# Patient Record
Sex: FEMALE | Race: WHITE | URBAN - METROPOLITAN AREA
[De-identification: names, ages, dates, MRNs, and addresses within clinical notes are randomized per-mention and may not be internally consistent; named-entity substitution may affect disease eponyms.]

---

## 2017-01-10 ENCOUNTER — OFFICE VISIT (OUTPATIENT)
Dept: FAMILY MEDICINE | Facility: CLINIC | Age: 37
End: 2017-01-10
Payer: MEDICAID

## 2017-01-10 VITALS
HEART RATE: 80 BPM | DIASTOLIC BLOOD PRESSURE: 64 MMHG | OXYGEN SATURATION: 97 % | TEMPERATURE: 97.6 F | BODY MASS INDEX: 18.61 KG/M2 | SYSTOLIC BLOOD PRESSURE: 124 MMHG | HEIGHT: 64 IN | WEIGHT: 109 LBS | RESPIRATION RATE: 14 BRPM

## 2017-01-10 DIAGNOSIS — M54.2 NECK PAIN: ICD-10-CM

## 2017-01-10 DIAGNOSIS — Z23 NEED FOR PROPHYLACTIC VACCINATION AND INOCULATION AGAINST INFLUENZA: ICD-10-CM

## 2017-01-10 DIAGNOSIS — F41.9 ANXIETY: Chronic | ICD-10-CM

## 2017-01-10 DIAGNOSIS — F10.10 ALCOHOL ABUSE: ICD-10-CM

## 2017-01-10 DIAGNOSIS — Z00.00 ROUTINE GENERAL MEDICAL EXAMINATION AT A HEALTH CARE FACILITY: Primary | ICD-10-CM

## 2017-01-10 DIAGNOSIS — F33.1 MAJOR DEPRESSIVE DISORDER, RECURRENT EPISODE, MODERATE (H): ICD-10-CM

## 2017-01-10 DIAGNOSIS — Z12.4 SCREENING FOR CERVICAL CANCER: ICD-10-CM

## 2017-01-10 PROBLEM — M79.7 FIBROMYALGIA: Status: ACTIVE | Noted: 2017-01-10

## 2017-01-10 LAB
ERYTHROCYTE [DISTWIDTH] IN BLOOD BY AUTOMATED COUNT: 14.9 % (ref 10–15)
FOLATE SERPL-MCNC: 3 NG/ML
HCT VFR BLD AUTO: 38.6 % (ref 35–47)
HGB BLD-MCNC: 13.5 G/DL (ref 11.7–15.7)
MCH RBC QN AUTO: 35.4 PG (ref 26.5–33)
MCHC RBC AUTO-ENTMCNC: 35 G/DL (ref 31.5–36.5)
MCV RBC AUTO: 101 FL (ref 78–100)
PLATELET # BLD AUTO: 222 10E9/L (ref 150–450)
RBC # BLD AUTO: 3.81 10E12/L (ref 3.8–5.2)
WBC # BLD AUTO: 7.7 10E9/L (ref 4–11)

## 2017-01-10 PROCEDURE — 99000 SPECIMEN HANDLING OFFICE-LAB: CPT | Performed by: PHYSICIAN ASSISTANT

## 2017-01-10 PROCEDURE — G0476 HPV COMBO ASSAY CA SCREEN: HCPCS | Performed by: PHYSICIAN ASSISTANT

## 2017-01-10 PROCEDURE — 36415 COLL VENOUS BLD VENIPUNCTURE: CPT | Performed by: PHYSICIAN ASSISTANT

## 2017-01-10 PROCEDURE — 99395 PREV VISIT EST AGE 18-39: CPT | Mod: 25 | Performed by: PHYSICIAN ASSISTANT

## 2017-01-10 PROCEDURE — 90686 IIV4 VACC NO PRSV 0.5 ML IM: CPT | Performed by: PHYSICIAN ASSISTANT

## 2017-01-10 PROCEDURE — 84425 ASSAY OF VITAMIN B-1: CPT | Mod: 90 | Performed by: PHYSICIAN ASSISTANT

## 2017-01-10 PROCEDURE — 87624 HPV HI-RISK TYP POOLED RSLT: CPT | Performed by: PHYSICIAN ASSISTANT

## 2017-01-10 PROCEDURE — G0145 SCR C/V CYTO,THINLAYER,RESCR: HCPCS | Performed by: PHYSICIAN ASSISTANT

## 2017-01-10 PROCEDURE — 82746 ASSAY OF FOLIC ACID SERUM: CPT | Performed by: PHYSICIAN ASSISTANT

## 2017-01-10 PROCEDURE — 85027 COMPLETE CBC AUTOMATED: CPT | Performed by: PHYSICIAN ASSISTANT

## 2017-01-10 PROCEDURE — 90471 IMMUNIZATION ADMIN: CPT | Performed by: PHYSICIAN ASSISTANT

## 2017-01-10 RX ORDER — GABAPENTIN 300 MG/1
300 CAPSULE ORAL 3 TIMES DAILY
Qty: 270 CAPSULE | Refills: 1 | Status: SHIPPED | OUTPATIENT
Start: 2017-01-10

## 2017-01-10 RX ORDER — BUSPIRONE HYDROCHLORIDE 15 MG/1
15 TABLET ORAL 2 TIMES DAILY
Qty: 180 TABLET | Refills: 1 | Status: SHIPPED | OUTPATIENT
Start: 2017-01-10

## 2017-01-10 RX ORDER — CITALOPRAM HYDROBROMIDE 40 MG/1
40 TABLET ORAL DAILY
Qty: 90 TABLET | Refills: 1 | Status: SHIPPED | OUTPATIENT
Start: 2017-01-10 | End: 2017-10-20

## 2017-01-10 RX ORDER — ACETAMINOPHEN AND CODEINE PHOSPHATE 120; 12 MG/5ML; MG/5ML
1 SOLUTION ORAL DAILY
Status: CANCELLED | OUTPATIENT
Start: 2017-01-10

## 2017-01-10 ASSESSMENT — ANXIETY QUESTIONNAIRES
3. WORRYING TOO MUCH ABOUT DIFFERENT THINGS: SEVERAL DAYS
1. FEELING NERVOUS, ANXIOUS, OR ON EDGE: SEVERAL DAYS
5. BEING SO RESTLESS THAT IT IS HARD TO SIT STILL: SEVERAL DAYS
7. FEELING AFRAID AS IF SOMETHING AWFUL MIGHT HAPPEN: SEVERAL DAYS
GAD7 TOTAL SCORE: 7
2. NOT BEING ABLE TO STOP OR CONTROL WORRYING: SEVERAL DAYS
6. BECOMING EASILY ANNOYED OR IRRITABLE: SEVERAL DAYS

## 2017-01-10 ASSESSMENT — PATIENT HEALTH QUESTIONNAIRE - PHQ9: 5. POOR APPETITE OR OVEREATING: SEVERAL DAYS

## 2017-01-10 NOTE — NURSING NOTE
"Chief Complaint   Patient presents with     Other     Needs edwina-7 and phq-9     Physical       Initial /64 mmHg  Pulse 80  Temp(Src) 97.6  F (36.4  C) (Tympanic)  Resp 14  Ht 5' 4\" (1.626 m)  Wt 109 lb (49.442 kg)  BMI 18.70 kg/m2  SpO2 97%  LMP 12/19/2016 (Approximate) Estimated body mass index is 18.7 kg/(m^2) as calculated from the following:    Height as of this encounter: 5' 4\" (1.626 m).    Weight as of this encounter: 109 lb (49.442 kg).  BP completed using cuff size: regular    "

## 2017-01-10 NOTE — ASSESSMENT & PLAN NOTE
- Improved with buspar and now that she is back to taking 40 mg of celexa rather than the 20 mg she was in Oct.  Understands that she needs to decrease drinking.  Has referral resources at home to get into counseling.  Will let us know if she needs additional treatment information.

## 2017-01-10 NOTE — PROGRESS NOTES
SUBJECTIVE:     CC: Chiquita Villaseñor is an 36 year old woman who presents for preventive health visit.     Healthy Habits:    Do you get at least three servings of calcium containing foods daily (dairy, green leafy vegetables, etc.)? yes    Amount of exercise or daily activities, outside of work: 2-3 day(s) per week    Problems taking medications regularly No    Medication side effects: No    Have you had an eye exam in the past two years? no    Do you see a dentist twice per year? yes    Do you have sleep apnea, excessive snoring or daytime drowsiness?no    Buspar helping, decreasing drinking.      Today's PHQ-2 Score:   PHQ-2 ( 1999 Pf[employer]) 1/10/2017 10/18/2016   Q1: Little interest or pleasure in doing things 1 1   Q2: Feeling down, depressed or hopeless 3 3   PHQ-2 Score 4 4       Abuse: Current or Past(Physical, Sexual or Emotional)- No  Do you feel safe in your environment - Yes    Social History   Substance Use Topics     Smoking status: Current Every Day Smoker -- 1.00 packs/day for 20 years     Types: Cigarettes     Smokeless tobacco: Never Used      Comment: started at age 16 - trying to quit with e-cigarettes     Alcohol Use: 0.0 oz/week     0 Standard drinks or equivalent per week      Comment: 6/23/2014 - drinking more up to 3 drinks several times a week to escape stress - Has drunk over 10 0z vodka in past          Standardized Alcohol Screening Questionnaire  AUDIT   Questions 0 1 2 3 4 Score   1. How often do you have a drink  containing alcohol? Never Monthly or less 2 to 4  times a  month 2 to 3  times a  week 4 or more  times a  week  4   2. How many drinks containing alcohol  do you have on a typical day when you are drinking? 1 or 2 3 or 4 5 or 6 7 to 9 10 or more  0   3. How often do you have more than five  or more drinks on one occasion? Never Less  than  monthly Monthly Weekly Daily or  almost  daily  3   4. How often during the last year have  you found that you were not able to stop  drinking once you had started? Never Less  than  monthly Monthly Weekly Daily or  almost  daily  4   5. How often during the last year have  you failed to do what was normally expected of you because of drinking? Never Less  than  monthly Monthly Weekly Daily or  almost  daily  3   6. How often during the last year have  you needed a first drink in the morning to get yourself going after a heavy drinking session? Never Less  than  monthly Monthly Weekly Daily or  almost  daily     7. How often during the last year have you had a feeling of guilt or remorse after drinking? Never Less  than  monthly Monthly Weekly Daily or  almost  daily     8. How often during the last year have  you been unable to remember what happened the night before because of your drinking? Never Less  than  monthly Monthly Weekly Daily or  almost  daily     9. Have you or someone else been  injured because of your drinking? No  Yes, but not in the last year  Yes,  during the  last year     10. Has a relative, friend, doctor or other health care worker been concerned about your drinking or suggested you cut down? No  Yes, but not in the last year  Yes,  during the  last year     Total  14   Scoring: A score of 7 for adult men is an indication of hazardous drinking (risk for physical or physiological harm); a score of 8 or more is an indication of an alcohol use disorder. A score of 5 or more for adult women  is an indication of hazardous drinking or an alcohol use disorder.       Recent Labs   Lab Test  06/23/14   1321  01/07/13   1409   CHOL  215*  200   HDL  125  101   LDL  74  74   TRIG  80  123   CHOLHDLRATIO  1.7  2.0     Reviewed orders with patient.  Reviewed health maintenance and updated orders accordingly - Yes    Mammo Decision Support:  Mammogram not appropriate for this patient based on age.    Pertinent mammograms are reviewed under the imaging tab.  History of abnormal Pap smear:   NO - age 30-65 PAP every 5 years with negative  "HPV co-testing recommended  Last 3 Pap Results:   PAP (no units)   Date Value   06/23/2014 NIL   01/07/2013 NIL     All Histories reviewed and updated in Epic.    ROS:  C: NEGATIVE for fever, chills, change in weight  I: NEGATIVE for worrisome rashes, moles or lesions  E: NEGATIVE for vision changes or irritation  ENT: NEGATIVE for ear, mouth and throat problems  R: NEGATIVE for significant cough or SOB  B: NEGATIVE for masses, tenderness or discharge  CV: NEGATIVE for chest pain, palpitations or peripheral edema  GI: NEGATIVE for nausea, abdominal pain, heartburn, or change in bowel habits. Positive for diarrhea, usually related to anxiety.  : NEGATIVE for unusual urinary or vaginal symptoms. Periods are regular.  MUSCULOSKELETAL:POSITIVE  for  chronic neck pain  NEURO: POSITIVE for HX headaches-musculoskelatal  PSYCHIATRIC: POSITIVE foralcohol abuse and anxiety    Problem list, Medication list, Allergies, and Medical/Social/Surgical histories reviewed in [facility] and updated as appropriate.  BP Readings from Last 3 Encounters:   01/10/17 124/64   10/18/16 120/76   02/23/16 132/82    Wt Readings from Last 3 Encounters:   01/10/17 109 lb (49.442 kg)   10/18/16 105 lb (47.628 kg)   02/23/16 115 lb 8 oz (52.39 kg)          OBJECTIVE:     /64 mmHg  Pulse 80  Temp(Src) 97.6  F (36.4  C) (Tympanic)  Resp 14  Ht 5' 4\" (1.626 m)  Wt 109 lb (49.442 kg)  BMI 18.70 kg/m2  SpO2 97%  LMP 12/19/2016 (Approximate)  EXAM:  GENERAL: healthy, alert and no distress  EYES: Eyes grossly normal to inspection, PERRL and conjunctivae and sclerae normal  HENT: ear canals and TM's normal, nose and mouth without ulcers or lesions  NECK: no adenopathy, no asymmetry, masses, or scars and thyroid normal to palpation  RESP: lungs clear to auscultation - no rales, rhonchi or wheezes  BREAST: normal without masses, tenderness or nipple discharge and no palpable axillary masses or adenopathy  CV: regular rate and rhythm, normal S1 S2, " no S3 or S4, no murmur, click or rub, no peripheral edema and peripheral pulses strong  ABDOMEN: soft, nontender, no hepatosplenomegaly, no masses and bowel sounds normal   (female): normal female external genitalia, normal urethral meatus, vaginal mucosa pink, moist, well rugated, and normal cervix/adnexa/uterus without masses or discharge  MS: no gross musculoskeletal defects noted, no edema  SKIN: no suspicious lesions or rashes  NEURO: Normal strength and tone, mentation intact and speech normal  PSYCH: mentation appears normal, affect normal/bright    ASSESSMENT/PLAN:         ICD-10-CM    1. Routine general medical examination at a health care facility Z00.00    2. Screening for cervical cancer Z12.4 Pap imaged thin layer screen with HPV - recommended age 30 - 65 years (select HPV order below)     HPV High Risk Types DNA Cervical   3. Neck pain M54.2 gabapentin (NEURONTIN) 300 MG capsule   4. Major depressive disorder, recurrent episode, moderate (H) F33.1 citalopram (CELEXA) 40 MG tablet   5. Anxiety F41.9 busPIRone (BUSPAR) 15 MG tablet   6. Alcohol abuse F10.10 Folate     Vitamin B1 whole blood     CBC with platelets   Alcohol abuse  - Discussed again with pt today.  Has decreased drinking since our last visit from about 3 drinks daily to two.  Knows she needs to cut back further but has been having significant stress at home and recently lost her job.  She has also been fighting with her boyfriend.  Will check labs today to make sure she does not need to be on any supplements.    Anxiety  - Improved with buspar and now that she is back to taking 40 mg of celexa rather than the 20 mg she was in Oct.  Understands that she needs to decrease drinking.  Has referral resources at home to get into counseling.  Will let us know if she needs additional treatment information.      PHQ-9 SCORE 2/23/2016 10/18/2016 1/10/2017   Total Score - - -   Total Score 11 9 9     SERENITY-7 SCORE 10/18/2016 1/10/2017   Total Score  "18 7         COUNSELING:   Reviewed preventive health counseling, as reflected in patient instructions       reports that she has been smoking Cigarettes.  She has a 20 pack-year smoking history. She has never used smokeless tobacco.  Tobacco Cessation Action Plan: Information offered: Patient not interested at this time  Estimated body mass index is 18.7 kg/(m^2) as calculated from the following:    Height as of this encounter: 5' 4\" (1.626 m).    Weight as of this encounter: 109 lb (49.442 kg).       Counseling Resources:  ATP IV Guidelines  Pooled Cohorts Equation Calculator  Breast Cancer Risk Calculator  FRAX Risk Assessment  ICSI Preventive Guidelines  Dietary Guidelines for Americans, 2010  USDA's MyPlate  ASA Prophylaxis  Lung CA Screening    Yun Palma PA-C  [facility]  "

## 2017-01-10 NOTE — PROGRESS NOTES
Injectable Influenza Immunization Documentation    1.  Is the person to be vaccinated sick today?  No    2. Does the person to be vaccinated have an allergy to eggs or to a component of the vaccine?  No    3. Has the person to be vaccinated today ever had a serious reaction to influenza vaccine in the past?  No    4. Has the person to be vaccinated ever had Guillain-Stockbridge syndrome?  No     Form completed by .Marie Campos CMA

## 2017-01-10 NOTE — MR AVS SNAPSHOT
After Visit Summary   1/10/2017    Chiquita Villaseñor    MRN: [id number]           Patient Information     Date Of Birth          1980        Visit Information        Provider Department      1/10/2017 2:10 PM Yun Palma PA-C [facility]        Today's Diagnoses     Routine general medical examination at a health care facility    -  1     Screening for cervical cancer         Neck pain         Major depressive disorder, recurrent episode, moderate (H)         Anxiety         Alcohol abuse           Care Instructions      Preventive Health Recommendations  Female Ages 26 - 39  Yearly exam:   See your health care provider every year in order to    Review health changes.     Discuss preventive care.      Review your medicines if you your doctor has prescribed any.    Until age 30: Get a Pap test every three years (more often if you have had an abnormal result).    After age 30: Talk to your doctor about whether you should have a Pap test every 3 years or have a Pap test with HPV screening every 5 years.   You do not need a Pap test if your uterus was removed (hysterectomy) and you have not had cancer.  You should be tested each year for STDs (sexually transmitted diseases), if you're at risk.   Talk to your provider about how often to have your cholesterol checked.  If you are at risk for diabetes, you should have a diabetes test (fasting glucose).  Shots: Get a flu shot each year. Get a tetanus shot every 10 years.   Nutrition:     Eat at least 5 servings of fruits and vegetables each day.    Eat whole-grain bread, whole-wheat pasta and brown rice instead of white grains and rice.    Talk to your provider about Calcium and Vitamin D.     Lifestyle    Exercise at least 150 minutes a week (30 minutes a day, 5 days of the week). This will help you control your weight and prevent disease.    Limit alcohol to one drink per day.    No smoking.     Wear  "sunscreen to prevent skin cancer.    See your dentist every six months for an exam and cleaning.          Follow-ups after your visit        Who to contact     If you have questions or need follow up information about today's clinic visit or your schedule please contact [facility] directly at [phone].  Normal or non-critical lab and imaging results will be communicated to you by MyChart, letter or phone within 4 business days after the clinic has received the results. If you do not hear from us within 7 days, please contact the clinic through [employer] or phone. If you have a critical or abnormal lab result, we will notify you by phone as soon as possible.  Submit refill requests through [employer] or call your pharmacy and they will forward the refill request to us. Please allow 3 business days for your refill to be completed.          Additional Information About Your Visit        MyChart Information     [employer] lets you send messages to your doctor, view your test results, renew your prescriptions, schedule appointments and more. To sign up, go to [email or web address] . Click on \"Log in\" on the left side of the screen, which will take you to the Welcome page. Then click on \"Sign up Now\" on the right side of the page.     You will be asked to enter the access code listed below, as well as some personal information. Please follow the directions to create your username and password.     Your access code is: [email or web address]  Expires: 2017  1:40 PM     Your access code will  in 90 days. If you need help or a new code, please call your [facility] or [phone].        Care EveryWhere ID     This is your Care EveryWhere ID. This could be used by other organizations to access your [facility] medical records  [id number]        Your Vitals Were     Pulse Temperature Respirations    80 97.6  F (36.4  C) (Tympanic) 14    Height BMI (Body Mass Index) Pulse Oximetry    5' 4\" " (1.626 m) 18.70 kg/m2 97%    Last Period          12/19/2016 (Approximate)         Blood Pressure from Last 3 Encounters:   01/10/17 124/64   10/18/16 120/76   02/23/16 132/82    Weight from Last 3 Encounters:   01/10/17 109 lb (49.442 kg)   10/18/16 105 lb (47.628 kg)   02/23/16 115 lb 8 oz (52.39 kg)              We Performed the Following     CBC with platelets     Folate     HPV High Risk Types DNA Cervical     Pap imaged thin layer screen with HPV - recommended age 30 - 65 years (select HPV order below)     Vitamin B1 whole blood          Today's Medication Changes          These changes are accurate as of: 1/10/17  2:21 PM.  If you have any questions, ask your nurse or doctor.               These medicines have changed or have updated prescriptions.        Dose/Directions    busPIRone 15 MG tablet   Commonly known as:  BUSPAR   This may have changed:    - medication strength  - how much to take  - how to take this  - when to take this  - additional instructions   Used for:  Anxiety   Changed by:  Yun Palma PA-C        Dose:  15 mg   Take 1 tablet (15 mg) by mouth 2 times daily   Quantity:  180 tablet   Refills:  1            Where to get your medicines      These medications were sent to [id number] - [place], [place] - [address] [place] [address]  [address] [place] [address]     Phone:  [phone]    - busPIRone 15 MG tablet  - citalopram 40 MG tablet  - gabapentin 300 MG capsule             Primary Care Provider Office Phone # Fax #    Yun Palma PA-C [phone]       [facility] [address]   [facility] [address]        Thank you!     Thank you for choosing [facility]  for your care. Our goal is always to provide you with excellent care. Hearing back from our patients is one way we can continue to improve our services. Please take a few minutes to complete the written survey that you may receive  in the mail after your visit with us. Thank you!             Your Updated Medication List - Protect others around you: Learn how to safely use, store and throw away your medicines at www.disposemymeds.org.          This list is accurate as of: 1/10/17  2:21 PM.  Always use your most recent med list.                   Brand Name Dispense Instructions for use    busPIRone 15 MG tablet    BUSPAR    180 tablet    Take 1 tablet (15 mg) by mouth 2 times daily       citalopram 40 MG tablet    celeXA    90 tablet    Take 1 tablet (40 mg) by mouth daily       fluticasone 50 MCG/ACT spray    FLONASE    1 Bottle    Spray 1 spray into both nostrils daily       gabapentin 300 MG capsule    NEURONTIN    270 capsule    Take 1 capsule (300 mg) by mouth 3 times daily       ibuprofen 800 MG tablet    ADVIL/MOTRIN    90 tablet    Take 1 tablet (800 mg) by mouth every 8 hours as needed for moderate pain (wih food)       norethindrone 0.35 MG per tablet    MICRONOR    1 Package    Take 1 tablet (0.35 mg) by mouth daily

## 2017-01-10 NOTE — PATIENT INSTRUCTIONS

## 2017-01-10 NOTE — ASSESSMENT & PLAN NOTE
- Discussed again with pt today.  Has decreased drinking since our last visit from about 3 drinks daily to two.  Knows she needs to cut back further but has been having significant stress at home and recently lost her job.  She has also been fighting with her boyfriend.  Will check labs today to make sure she does not need to be on any supplements.

## 2017-01-10 NOTE — Clinical Note
[facility]  [address]  [address]  [place] [address]  [phone]                                                                                                           Chiquita Villaseñor  [address] [place] [address] [address]  [facility] [address]    January 16, 2017      Dear Chiquita,    The results of your recent tests were reviewed and are enclosed.     Please start the folate supplement as was discussed on the phone.  The size of your red blood cells is large because of your chronic alcohol use (mcv and mch), once you decrease your alcohol use, those number should improve.      Folate   Result Value Ref Range    Folate 3.0 (L) >5.4 ng/mL   Vitamin B1 whole blood   Result Value Ref Range    Vitamin B1 Whole Blood Level 99    CBC with platelets   Result Value Ref Range    WBC 7.7 4.0 - 11.0 10e9/L    RBC Count 3.81 3.8 - 5.2 10e12/L    Hemoglobin 13.5 11.7 - 15.7 g/dL    Hematocrit 38.6 35.0 - 47.0 %     (H) 78 - 100 fl    MCH 35.4 (H) 26.5 - 33.0 pg    MCHC 35.0 31.5 - 36.5 g/dL    RDW 14.9 10.0 - 15.0 %    Platelet Count 222 150 - 450 10e9/L     Thank you for choosing [facility].  We appreciate the opportunity to serve you and look forward to supporting your healthcare needs in the future.    If you have any questions or concerns, please call me or my staff at [phone].    Sincerely,      Yun Palma PA-C

## 2017-01-10 NOTE — PROGRESS NOTES
Got letter from insurance saying that her gabapentin and citalopram would not be covered.  The said covered alternatives were gabapentin and citalopram.  No information on if dose change was needed.  Will refill medications today and see if a PA is needed and what they actually mean as covered replacements.

## 2017-01-10 NOTE — Clinical Note
[facility]  [address]  [address]  [place] [address]  [phone]      January 19, 2017    Chiquita Villaseñor  [address]  [place] [address]    Dear Chiquita,  We are happy to inform you that your PAP smear result from 01/10/17 is normal.  We are now able to do a follow up test on PAP smears. The DNA test is for HPV (Human Papilloma Virus). Cervical cancer is closely linked with certain types of HPV. Your result showed no evidence of high risk HPV.  Therefore we recommend you return in 3 years for your next pap smear.  You will still need to return to the clinic every year for an annual exam and other preventive tests.  Please contact the clinic with any questions.  Sincerely,  Yun Palma PA-C/nahomi

## 2017-01-11 ASSESSMENT — ANXIETY QUESTIONNAIRES: GAD7 TOTAL SCORE: 7

## 2017-01-11 ASSESSMENT — PATIENT HEALTH QUESTIONNAIRE - PHQ9: SUM OF ALL RESPONSES TO PHQ QUESTIONS 1-9: 9

## 2017-01-12 LAB
COPATH REPORT: NORMAL
PAP: NORMAL

## 2017-01-13 LAB — VIT B1 BLD-MCNC: 99 UG/DL

## 2017-01-13 RX ORDER — FOLIC ACID 1 MG/1
1 TABLET ORAL DAILY
Qty: 100 TABLET | Refills: 3 | Status: SHIPPED | OUTPATIENT
Start: 2017-01-13

## 2017-01-19 LAB
FINAL DIAGNOSIS: NORMAL
HPV HR 12 DNA CVX QL NAA+PROBE: NEGATIVE
HPV16 DNA SPEC QL NAA+PROBE: NEGATIVE
HPV18 DNA SPEC QL NAA+PROBE: NEGATIVE
SPECIMEN DESCRIPTION: NORMAL

## 2017-09-19 ENCOUNTER — TELEPHONE (OUTPATIENT)
Dept: FAMILY MEDICINE | Facility: CLINIC | Age: 37
End: 2017-09-19

## 2017-09-19 DIAGNOSIS — F17.200 TOBACCO USE DISORDER: Primary | ICD-10-CM

## 2017-09-19 RX ORDER — NICOTINE 21 MG/24HR
1 PATCH, TRANSDERMAL 24 HOURS TRANSDERMAL EVERY 24 HOURS
Qty: 30 PATCH | Refills: 3 | Status: SHIPPED | OUTPATIENT
Start: 2017-09-19 | End: 2017-11-30

## 2017-09-19 RX ORDER — POLYETHYLENE GLYCOL 3350 17 G
2 POWDER IN PACKET (EA) ORAL
Qty: 135 LOZENGE | Refills: 3 | Status: SHIPPED | OUTPATIENT
Start: 2017-09-19 | End: 2017-11-30

## 2017-09-19 NOTE — TELEPHONE ENCOUNTER
Called Chiquita and let her know that these have been sent to the [facility] pharmacy on [address].

## 2017-09-19 NOTE — TELEPHONE ENCOUNTER
Patient called the clinic requesting nicotine lozenges and patches be sent to [facility] on [address]. She is trying to quit smoking and these have worked in the past. Orders pended, provider to review.

## 2017-10-20 DIAGNOSIS — F33.1 MAJOR DEPRESSIVE DISORDER, RECURRENT EPISODE, MODERATE (H): ICD-10-CM

## 2017-10-23 RX ORDER — CITALOPRAM HYDROBROMIDE 40 MG/1
TABLET ORAL
Qty: 30 TABLET | Refills: 0 | Status: SHIPPED | OUTPATIENT
Start: 2017-10-23 | End: 2017-10-27

## 2017-10-23 ASSESSMENT — PATIENT HEALTH QUESTIONNAIRE - PHQ9: SUM OF ALL RESPONSES TO PHQ QUESTIONS 1-9: 4

## 2017-10-23 NOTE — TELEPHONE ENCOUNTER
Last Office Visit with [facility] primary care provider:  01/10/2017        Last PHQ-9 score on record=   PHQ-9 SCORE 1/10/2017   Total Score -   Total Score 9         Prescription approved per [facility] Refill Protocol.

## 2017-10-27 ENCOUNTER — OFFICE VISIT (OUTPATIENT)
Dept: FAMILY MEDICINE | Facility: CLINIC | Age: 37
End: 2017-10-27
Payer: MEDICAID

## 2017-10-27 VITALS
BODY MASS INDEX: 21.8 KG/M2 | SYSTOLIC BLOOD PRESSURE: 120 MMHG | RESPIRATION RATE: 14 BRPM | DIASTOLIC BLOOD PRESSURE: 64 MMHG | WEIGHT: 127 LBS | HEART RATE: 88 BPM | OXYGEN SATURATION: 97 % | TEMPERATURE: 97.3 F

## 2017-10-27 DIAGNOSIS — Z23 NEED FOR PROPHYLACTIC VACCINATION AND INOCULATION AGAINST INFLUENZA: ICD-10-CM

## 2017-10-27 DIAGNOSIS — F33.1 MAJOR DEPRESSIVE DISORDER, RECURRENT EPISODE, MODERATE (H): ICD-10-CM

## 2017-10-27 DIAGNOSIS — F10.10 ALCOHOL ABUSE: Primary | ICD-10-CM

## 2017-10-27 DIAGNOSIS — F17.200 TOBACCO USE DISORDER: ICD-10-CM

## 2017-10-27 DIAGNOSIS — M54.2 NECK PAIN: ICD-10-CM

## 2017-10-27 PROCEDURE — 99214 OFFICE O/P EST MOD 30 MIN: CPT | Mod: 25 | Performed by: PHYSICIAN ASSISTANT

## 2017-10-27 PROCEDURE — 90471 IMMUNIZATION ADMIN: CPT | Performed by: PHYSICIAN ASSISTANT

## 2017-10-27 PROCEDURE — 90686 IIV4 VACC NO PRSV 0.5 ML IM: CPT | Performed by: PHYSICIAN ASSISTANT

## 2017-10-27 RX ORDER — CLONAZEPAM 0.5 MG/1
TABLET ORAL
Qty: 15 TABLET | Refills: 0 | Status: SHIPPED | OUTPATIENT
Start: 2017-10-27 | End: 2017-11-30

## 2017-10-27 RX ORDER — CITALOPRAM HYDROBROMIDE 40 MG/1
40 TABLET ORAL DAILY
Qty: 30 TABLET | Refills: 0 | Status: CANCELLED | OUTPATIENT
Start: 2017-10-27

## 2017-10-27 RX ORDER — GABAPENTIN 300 MG/1
300 CAPSULE ORAL 3 TIMES DAILY
Qty: 270 CAPSULE | Refills: 1 | Status: CANCELLED | OUTPATIENT
Start: 2017-10-27

## 2017-10-27 RX ORDER — DULOXETIN HYDROCHLORIDE 30 MG/1
30 CAPSULE, DELAYED RELEASE ORAL 2 TIMES DAILY
Qty: 60 CAPSULE | Refills: 0 | Status: SHIPPED | OUTPATIENT
Start: 2017-10-27 | End: 2017-11-30

## 2017-10-27 ASSESSMENT — ANXIETY QUESTIONNAIRES
2. NOT BEING ABLE TO STOP OR CONTROL WORRYING: MORE THAN HALF THE DAYS
3. WORRYING TOO MUCH ABOUT DIFFERENT THINGS: MORE THAN HALF THE DAYS
5. BEING SO RESTLESS THAT IT IS HARD TO SIT STILL: SEVERAL DAYS
6. BECOMING EASILY ANNOYED OR IRRITABLE: SEVERAL DAYS
1. FEELING NERVOUS, ANXIOUS, OR ON EDGE: SEVERAL DAYS
GAD7 TOTAL SCORE: 10
7. FEELING AFRAID AS IF SOMETHING AWFUL MIGHT HAPPEN: MORE THAN HALF THE DAYS

## 2017-10-27 ASSESSMENT — PATIENT HEALTH QUESTIONNAIRE - PHQ9
5. POOR APPETITE OR OVEREATING: SEVERAL DAYS
SUM OF ALL RESPONSES TO PHQ QUESTIONS 1-9: 6

## 2017-10-27 NOTE — PROGRESS NOTES
SUBJECTIVE:   Chiquita Villaseñor is a 37 year old female who presents to clinic today for the following health issues:    Depression and Anxiety Follow-Up    Status since last visit: Worsened     Other associated symptoms:None    Complicating factors:     Significant life event: Yes-  Living situation      Current substance abuse: Alcohol daily-lately 2-3 drinks, nicotine  PHQ-9 Score and MyChart F/U Questions 1/10/2017 10/23/2017 10/27/2017   Total Score 9 4 6   Q9: Suicide Ideation Not at all Not at all Not at all     SERENITY-7 SCORE 10/18/2016 1/10/2017 10/27/2017   Total Score 18 7 10     Chronic Pain Follow-Up       Type / Location of Pain: neck and all over body; mostly neck  Analgesia/pain control:       Recent changes:  same      Overall control: Tolerable with discomfort  Activity level/function:      Daily activities:  Can do most things most days, with some rest    Work:  not applicable  Adverse effects:  No  Adherance    Taking medication as directed?  Yes-patient states that she has not been currently taking gabapentin, she takes ibuprofen and kratom    Participating in other treatments: not applicable  Risk Factors:    Sleep:  Fair    Mood/anxiety:  slightly worsened    Recent family or social stressors:  none noted    Other aggravating factors: none  PHQ-9 SCORE 1/10/2017 10/23/2017 10/27/2017   Total Score - - -   Total Score 9 4 6     SERENITY-7 SCORE 10/18/2016 1/10/2017 10/27/2017   Total Score 18 7 10     Encounter-Level CSA:     There are no encounter-level csa.          Amount of exercise or physical activity: None    Problems taking medications regularly: No    Medication side effects: none    Diet: regular (no restrictions)  Reviewed and updated as needed this visit by clinical staff  Tobacco  Allergies  Meds  Med Hx  Surg Hx  Fam Hx  Soc Hx      Reviewed and updated as needed this visit by Provider  Tobacco  Allergies  Meds  Med Hx  Surg Hx  Fam Hx  Soc Hx      Additional complaints:  None    HPI additional notes: Chiquita presents today with   Chief Complaint   Patient presents with     Anxiety     Depression     Pain   Living with her mom, partner is in rehab right now for alcohol use and doing well.  Being active.  Working on finding a place to go in December when boyfriend gets out of rehab.  Significantly stressed and not working but feels safe at home with her mom and her anxiety and depression have overall improve.  She was previously underweight but has put on some weight since our last visit and is now at a healthy bmi.       Wt Readings from Last 5 Encounters:   10/27/17 127 lb (57.6 kg)   01/10/17 109 lb (49.4 kg)   10/18/16 105 lb (47.6 kg)   02/23/16 115 lb 8 oz (52.4 kg)   12/10/14 114 lb (51.7 kg)         ROS:  C: Negative for fever, chills, recent change in weight  Skin: Negative for worrisome rashes or lesions  ENT: Negative for ear, mouth and throat problems  Resp: Negative for significant cough or SOB  CV: Negative for chest pain or peripheral edema  GI: Negative for nausea, abdominal pain, heartburn, or change in bowel habits  MS: Positive for neck, fibromyalgia pain  P: Negative for changes in mood or affect  ROS all other systems negative.    Chart Review:  History   Smoking Status     Current Every Day Smoker     Packs/day: 1.00     Years: 20.00     Types: Cigarettes   Smokeless Tobacco     Never Used     Comment: started at age 16 - trying to quit with e-cigarettes     PHQ-9 SCORE 1/10/2017 10/23/2017 10/27/2017   Total Score - - -   Total Score 9 4 6     SERENITY-7 SCORE 10/18/2016 1/10/2017 10/27/2017   Total Score 18 7 10     PFSH: Current alcohol abuse       Recent Labs   Lab Test  06/23/14   1321  01/07/13   1409  06/21/11   1709   LDL  74  74  124.6*   HDL  125  101  61*   TRIG  80  123  47   TSH   --   0.89   --       BP Readings from Last 3 Encounters:   10/27/17 120/64   01/10/17 124/64   10/18/16 120/76    Wt Readings from Last 3 Encounters:   10/27/17 127 lb (57.6  kg)   01/10/17 109 lb (49.4 kg)   10/18/16 105 lb (47.6 kg)                  Patient Active Problem List   Diagnosis     Irritable bowel syndrome     Anxiety     Tobacco use disorder     S/P LEEP of cervix - 2004     Major depressive disorder, recurrent episode, moderate (HCC)     Chronic rhinitis     Chronic pain syndrome     Neck pain     Alcohol abuse     Fibromyalgia     Past Surgical History:   Procedure Laterality Date     C NONSPECIFIC PROCEDURE  658437    dilation of urethra   abstr 995121     GYN SURGERY  2004    cone biopsy, LEEP for dysplasia + HPV     HC TOOTH EXTRACTION W/FORCEP  1999     Problem list, Medication list, Allergies, Medical/Social/Surg hx reviewed in [facility], updated as appropriate.   OBJECTIVE:                                                    /64  Pulse 88  Temp 97.3  F (36.3  C) (Tympanic)  Resp 14  Wt 127 lb (57.6 kg)  LMP 10/13/2017 (Approximate)  SpO2 97%  BMI 21.8 kg/m2  Body mass index is 21.8 kg/(m^2).  GENERAL: healthy, alert, in no acute distress  SKIN: no suspicious lesions, no rashes  PSYCH:Mental Status Exam  Behavior: cooperative, pleasant and calm  Speech: normal rate and rhythm  Mood: description consistent with euthymia  Affect: Appropriate/mood-congruent  Thought Processes: Logical and Goal directed  Thought Content: no evidence of suicidal or homicidal ideation and no overt psychosis  Insight: Good  Judgment: Fair      Diagnostic test results: none      ASSESSMENT/PLAN:                                                          ICD-10-CM    1. Alcohol abuse F10.10    2. Major depressive disorder, recurrent episode, moderate (H) F33.1 DULoxetine (CYMBALTA) 30 MG EC capsule     clonazePAM (KLONOPIN) 0.5 MG tablet   3. Tobacco use disorder F17.200 TOBACCO CESSATION ORDER FOR    4. Need for prophylactic vaccination and inoculation against influenza Z23 FLU VAC, SPLIT VIRUS IM > 3 YO (QUADRIVALENT) [31719]     Vaccine Administration, Initial [79858]   5. Neck pain  M54.2 DULoxetine (CYMBALTA) 30 MG EC capsule   Pt continue to struggle with alcohol use.  She has been decreasing her intake and only drinks in the evening, 1-2 nightly.  She has worsening pain and anxiety.  Will try switching celexa to cymbalta.  She will do a cross taper for 1 week.  Will give only a very small amount of klonopin for panic attacks, discussed at length risks of mixing with alcohol and will discontinue if she uses it more than 3 times per week.  Will follow up for 1 month recheck.    Please see patient instructions for treatment details.    Follow up office visit in 1 month, sooner PRN.    Yun Palma PA-C  [facility]             Injectable Influenza Immunization Documentation    1.  Is the person to be vaccinated sick today?   No    2. Does the person to be vaccinated have an allergy to a component   of the vaccine?   No  Egg Allergy Algorithm Link    3. Has the person to be vaccinated ever had a serious reaction   to influenza vaccine in the past?   No    4. Has the person to be vaccinated ever had Guillain-Barré syndrome?   No    Form completed by Marie Campos [facility]

## 2017-10-27 NOTE — MR AVS SNAPSHOT
After Visit Summary   10/27/2017    Chiquita Villaseñor    MRN: [id number]           Patient Information     Date Of Birth          1980        Visit Information        Provider Department      10/27/2017 10:50 AM Yun Palma PA-C [facility]        Today's Diagnoses     Alcohol abuse    -  1    Major depressive disorder, recurrent episode, moderate (H)        Tobacco use disorder        Need for prophylactic vaccination and inoculation against influenza        Neck pain          Care Instructions      Treating Anxiety Disorders with Medication  Anxiety disorders cause intense feelings of fear or panic. Even physical symptoms, such as a racing heartbeat or dizziness, can be felt. If you have these feelings, you don t have to suffer anymore. Treatment to help you overcome your fears will likely include therapy (also called counseling). Medication may also be prescribed to help control your symptoms.    Medications  Certain medications may be prescribed to help control your symptoms. As a result, you may feel less anxious. You may also feel able to move forward with therapy. At first, medications and dosages may need to be adjusted to find what works best for you. Try to be patient. Tell your doctor how a medication makes you feel. This way, you can work together to find the treatment that s best for you. Keep in mind that medications can have side effects. Talk to your doctor about any side effects that are bothering you. Changing the dose or type of medication may help. Don t stop taking medication on your own because it can cause symptoms to come back.    Anti-Anxiety Medication: This medication relieves symptoms and helps you relax. Your healthcare provider will explain when and how to use it. It may be prescribed for use before entering situations that makes you anxious. Or, you may be told to take it on a regular schedule. Anti-anxiety medication  may make you feel a little sleepy or  out of it.  Don t drive a car or operate machinery while on this medication, until you know how it affects you.  CAUTION  Never use alcohol or other drugs with anti-anxiety medications. This could result in coma or death. Also, use only the amount of medication your doctor prescribes. If you think you may have taken too much, get emergency care right away.     Antidepressant Medication: This kind of medication is often used to treat anxiety, even if you aren t depressed. An antidepressant balances out brain chemicals. This helps keep anxiety under control. This medication is taken on a schedule. It takes a few weeks to start working. If you don t notice a change at first, you may just need more time. But if you don t notice results after the first few weeks, tell your doctor.  Keep Taking Medications as Prescribed  Never change your dosage or stop taking your medications without talking to your doctor first. Keep the following in mind:    Some medications must be taken on a schedule. Make this part of your daily routine. For instance, always take your pill before brushing your teeth. A pillbox can help you remember if you ve taken your medication each day.    Medications are often taken for 6-12 months. Your healthcare provider will then evaluate whether you need to stay on them. Many people who have also had therapy may no longer need medication to manage anxiety.    You may need to stop taking medication slowly to give your body time to adjust. When it s time to stop, your doctor will tell you more. Remember: Never stop taking your medication without talking to your doctor first.    If symptoms return, you may need to start taking medications again. This isn t your fault. It s just the nature of your anxiety disorder.  Special Concerns    Side effects: Medications may cause side effects. Ask your doctor or pharmacist what you can expect. They may have ideas for avoiding some  "side effects.    Sexual problems: Some antidepressants can affect your desire for sex or your ability to have an orgasm. A change in dosage or medication often solves the problem. If you have a sexual side effect that concerns you, tell your doctor.    Addiction: Antidepressants are not addictive. And if you ve never had a problem with drugs or alcohol, you likely won t have a problem with anti-anxiety medication. But if you have history of addiction, this medication may need to be avoided.     2446-1824 [employer]. [address] [place] [address]. All rights reserved. This information is not intended as a substitute for professional medical care. Always follow your healthcare professional's instructions.            Follow-ups after your visit        Who to contact     If you have questions or need follow up information about today's clinic visit or your schedule please contact [facility] directly at [phone].  Normal or non-critical lab and imaging results will be communicated to you by SlideRockethart, letter or phone within 4 business days after the clinic has received the results. If you do not hear from us within 7 days, please contact the clinic through SlideRockethart or phone. If you have a critical or abnormal lab result, we will notify you by phone as soon as possible.  Submit refill requests through Ansible or call your pharmacy and they will forward the refill request to us. Please allow 3 business days for your refill to be completed.          Additional Information About Your Visit        Ansible Information     Ansible lets you send messages to your doctor, view your test results, renew your prescriptions, schedule appointments and more. To sign up, go to [email or web address] . Click on \"Log in\" on the left side of the screen, which will take you to the Welcome page. Then click on \"Sign up Now\" on the right side of the page.     You will be asked to enter " the access code listed below, as well as some personal information. Please follow the directions to create your username and password.     Your access code is: [email or web address]  Expires: 2018  8:22 AM     Your access code will  in 90 days. If you need help or a new code, please call your [facility] clinic or [phone].        Care EveryWhere ID     This is your Care EveryWhere ID. This could be used by other organizations to access your [facility] medical records  [id number]        Your Vitals Were     Pulse Temperature Respirations Last Period Pulse Oximetry BMI (Body Mass Index)    88 97.3  F (36.3  C) (Tympanic) 14 10/13/2017 (Approximate) 97% 21.8 kg/m2       Blood Pressure from Last 3 Encounters:   10/27/17 120/64   01/10/17 124/64   10/18/16 120/76    Weight from Last 3 Encounters:   10/27/17 127 lb (57.6 kg)   01/10/17 109 lb (49.4 kg)   10/18/16 105 lb (47.6 kg)              We Performed the Following     DEPRESSION ACTION PLAN (DAP)     FLU VAC, SPLIT VIRUS IM > 3 YO (QUADRIVALENT) [58509]     TOBACCO CESSATION ORDER FOR HM     Vaccine Administration, Initial [35533]          Today's Medication Changes          These changes are accurate as of: 10/27/17 11:59 PM.  If you have any questions, ask your nurse or doctor.               Start taking these medicines.        Dose/Directions    clonazePAM 0.5 MG tablet   Commonly known as:  klonoPIN   Used for:  Major depressive disorder, recurrent episode, moderate (H)   Started by:  Yun Palma PA-C        Take 1/2-1 tab up to 3 days per week as needed for anxiety.   Quantity:  15 tablet   Refills:  0       DULoxetine 30 MG EC capsule   Commonly known as:  CYMBALTA   Used for:  Major depressive disorder, recurrent episode, moderate (H), Neck pain   Started by:  Yun Palma PA-C        Dose:  30 mg   Take 1 capsule (30 mg) by mouth 2 times daily   Quantity:  60 capsule   Refills:  0         Stop taking these medicines if  you haven't already. Please contact your care team if you have questions.     citalopram 40 MG tablet   Commonly known as:  celeXA   Stopped by:  Yun Palma PA-C           fluticasone 50 MCG/ACT spray   Commonly known as:  FLONASE   Stopped by:  Yun Palma PA-C                Where to get your medicines      These medications were sent to [id number] [place], [place] - [address]  [address] [address]     Phone:  [phone]     DULoxetine 30 MG EC capsule         Some of these will need a paper prescription and others can be bought over the counter.  Ask your nurse if you have questions.     Bring a paper prescription for each of these medications     clonazePAM 0.5 MG tablet                Primary Care Provider Office Phone # Fax #    Yun Palma PA-C [phone]       [address]  [facility] [address]        Equal Access to Services     MAC MON : Hadii aad ku hadasho Soomaali, waaxda luqadaha, qaybta kaalmada adeegyada, waxay idiin hayaan adeeg kharalaney patrick . So [facility] [phone].    ATENCIÓN: Si habla español, tiene a hodges disposición servicios gratuitos de asistencia lingüística. [facility] [phone].    We comply with applicable federal civil rights laws and [place] laws. We do not discriminate on the basis of race, color, national origin, age, disability, sex, sexual orientation, or gender identity.            Thank you!     Thank you for choosing [facility]  for your care. Our goal is always to provide you with excellent care. Hearing back from our patients is one way we can continue to improve our services. Please take a few minutes to complete the written survey that you may receive in the mail after your visit with us. Thank you!             Your Updated Medication List - Protect others around you: Learn how to safely use, store and throw  away your medicines at www.disposemymeds.org.          This list is accurate as of: 10/27/17 11:59 PM.  Always use your most recent med list.                   Brand Name Dispense Instructions for use Diagnosis    busPIRone 15 MG tablet    BUSPAR    180 tablet    Take 1 tablet (15 mg) by mouth 2 times daily    Anxiety       clonazePAM 0.5 MG tablet    klonoPIN    15 tablet    Take 1/2-1 tab up to 3 days per week as needed for anxiety.    Major depressive disorder, recurrent episode, moderate (H)       DULoxetine 30 MG EC capsule    CYMBALTA    60 capsule    Take 1 capsule (30 mg) by mouth 2 times daily    Major depressive disorder, recurrent episode, moderate (H), Neck pain       folic acid 1 MG tablet    FOLVITE    100 tablet    Take 1 tablet (1 mg) by mouth daily    Alcohol abuse       gabapentin 300 MG capsule    NEURONTIN    270 capsule    Take 1 capsule (300 mg) by mouth 3 times daily    Neck pain       ibuprofen 800 MG tablet    ADVIL/MOTRIN    90 tablet    Take 1 tablet (800 mg) by mouth every 8 hours as needed for moderate pain (wih food)    Chronic pain       nicotine 14 MG/24HR 24 hr patch    NICODERM CQ    30 patch    Place 1 patch onto the skin every 24 hours    Tobacco use disorder       nicotine polacrilex 2 MG lozenge    COMMIT    135 lozenge    Place 1 lozenge (2 mg) inside cheek every hour as needed for smoking cessation    Tobacco use disorder

## 2017-10-27 NOTE — NURSING NOTE
"Chief Complaint   Patient presents with     Anxiety     Depression       Initial /64  Pulse 88  Temp 97.3  F (36.3  C) (Tympanic)  Resp 14  Wt 127 lb (57.6 kg)  LMP 10/13/2017 (Approximate)  SpO2 97%  BMI 21.8 kg/m2 Estimated body mass index is 21.8 kg/(m^2) as calculated from the following:    Height as of 1/10/17: 5' 4\" (1.626 m).    Weight as of this encounter: 127 lb (57.6 kg).  Medication Reconciliation: complete    "

## 2017-10-28 ASSESSMENT — ANXIETY QUESTIONNAIRES: GAD7 TOTAL SCORE: 10

## 2017-10-30 NOTE — PATIENT INSTRUCTIONS
Treating Anxiety Disorders with Medication  Anxiety disorders cause intense feelings of fear or panic. Even physical symptoms, such as a racing heartbeat or dizziness, can be felt. If you have these feelings, you don t have to suffer anymore. Treatment to help you overcome your fears will likely include therapy (also called counseling). Medication may also be prescribed to help control your symptoms.    Medications  Certain medications may be prescribed to help control your symptoms. As a result, you may feel less anxious. You may also feel able to move forward with therapy. At first, medications and dosages may need to be adjusted to find what works best for you. Try to be patient. Tell your doctor how a medication makes you feel. This way, you can work together to find the treatment that s best for you. Keep in mind that medications can have side effects. Talk to your doctor about any side effects that are bothering you. Changing the dose or type of medication may help. Don t stop taking medication on your own because it can cause symptoms to come back.    Anti-Anxiety Medication: This medication relieves symptoms and helps you relax. Your healthcare provider will explain when and how to use it. It may be prescribed for use before entering situations that makes you anxious. Or, you may be told to take it on a regular schedule. Anti-anxiety medication may make you feel a little sleepy or  out of it.  Don t drive a car or operate machinery while on this medication, until you know how it affects you.  CAUTION  Never use alcohol or other drugs with anti-anxiety medications. This could result in coma or death. Also, use only the amount of medication your doctor prescribes. If you think you may have taken too much, get emergency care right away.     Antidepressant Medication: This kind of medication is often used to treat anxiety, even if you aren t depressed. An antidepressant balances out brain chemicals. This helps  keep anxiety under control. This medication is taken on a schedule. It takes a few weeks to start working. If you don t notice a change at first, you may just need more time. But if you don t notice results after the first few weeks, tell your doctor.  Keep Taking Medications as Prescribed  Never change your dosage or stop taking your medications without talking to your doctor first. Keep the following in mind:    Some medications must be taken on a schedule. Make this part of your daily routine. For instance, always take your pill before brushing your teeth. A pillbox can help you remember if you ve taken your medication each day.    Medications are often taken for 6-12 months. Your healthcare provider will then evaluate whether you need to stay on them. Many people who have also had therapy may no longer need medication to manage anxiety.    You may need to stop taking medication slowly to give your body time to adjust. When it s time to stop, your doctor will tell you more. Remember: Never stop taking your medication without talking to your doctor first.    If symptoms return, you may need to start taking medications again. This isn t your fault. It s just the nature of your anxiety disorder.  Special Concerns    Side effects: Medications may cause side effects. Ask your doctor or pharmacist what you can expect. They may have ideas for avoiding some side effects.    Sexual problems: Some antidepressants can affect your desire for sex or your ability to have an orgasm. A change in dosage or medication often solves the problem. If you have a sexual side effect that concerns you, tell your doctor.    Addiction: Antidepressants are not addictive. And if you ve never had a problem with drugs or alcohol, you likely won t have a problem with anti-anxiety medication. But if you have history of addiction, this medication may need to be avoided.     3917-7855 [employer]. [address] [place] [address], [place]  [address]. All rights reserved. This information is not intended as a substitute for professional medical care. Always follow your healthcare professional's instructions.

## 2017-11-30 ENCOUNTER — OFFICE VISIT (OUTPATIENT)
Dept: FAMILY MEDICINE | Facility: CLINIC | Age: 37
End: 2017-11-30
Payer: COMMERCIAL

## 2017-11-30 VITALS
SYSTOLIC BLOOD PRESSURE: 110 MMHG | WEIGHT: 125 LBS | HEART RATE: 99 BPM | BODY MASS INDEX: 21.46 KG/M2 | DIASTOLIC BLOOD PRESSURE: 64 MMHG | OXYGEN SATURATION: 96 % | RESPIRATION RATE: 20 BRPM | TEMPERATURE: 98.4 F

## 2017-11-30 DIAGNOSIS — M79.7 FIBROMYALGIA: ICD-10-CM

## 2017-11-30 DIAGNOSIS — M54.2 NECK PAIN: ICD-10-CM

## 2017-11-30 DIAGNOSIS — F10.10 ALCOHOL ABUSE: Chronic | ICD-10-CM

## 2017-11-30 DIAGNOSIS — F33.1 MAJOR DEPRESSIVE DISORDER, RECURRENT EPISODE, MODERATE (H): Primary | Chronic | ICD-10-CM

## 2017-11-30 DIAGNOSIS — F41.9 ANXIETY: Chronic | ICD-10-CM

## 2017-11-30 DIAGNOSIS — F17.200 TOBACCO USE DISORDER: ICD-10-CM

## 2017-11-30 DIAGNOSIS — F33.1 MAJOR DEPRESSIVE DISORDER, RECURRENT EPISODE, MODERATE (H): ICD-10-CM

## 2017-11-30 PROCEDURE — 99214 OFFICE O/P EST MOD 30 MIN: CPT | Performed by: PHYSICIAN ASSISTANT

## 2017-11-30 RX ORDER — DULOXETIN HYDROCHLORIDE 30 MG/1
30 CAPSULE, DELAYED RELEASE ORAL 2 TIMES DAILY
Qty: 60 CAPSULE | Refills: 2 | Status: SHIPPED | OUTPATIENT
Start: 2017-11-30 | End: 2018-02-01

## 2017-11-30 RX ORDER — CLONAZEPAM 0.5 MG/1
TABLET ORAL
Qty: 15 TABLET | Refills: 2 | Status: SHIPPED | OUTPATIENT
Start: 2017-11-30 | End: 2018-02-21

## 2017-11-30 RX ORDER — NALTREXONE HYDROCHLORIDE 50 MG/1
50 TABLET, FILM COATED ORAL DAILY
Qty: 30 TABLET | Refills: 2 | Status: SHIPPED | OUTPATIENT
Start: 2017-11-30 | End: 2017-12-04 | Stop reason: SINTOL

## 2017-11-30 ASSESSMENT — ANXIETY QUESTIONNAIRES
7. FEELING AFRAID AS IF SOMETHING AWFUL MIGHT HAPPEN: SEVERAL DAYS
7. FEELING AFRAID AS IF SOMETHING AWFUL MIGHT HAPPEN: SEVERAL DAYS
6. BECOMING EASILY ANNOYED OR IRRITABLE: MORE THAN HALF THE DAYS
1. FEELING NERVOUS, ANXIOUS, OR ON EDGE: SEVERAL DAYS
GAD7 TOTAL SCORE: 10
2. NOT BEING ABLE TO STOP OR CONTROL WORRYING: SEVERAL DAYS
GAD7 TOTAL SCORE: 10
5. BEING SO RESTLESS THAT IT IS HARD TO SIT STILL: MORE THAN HALF THE DAYS
3. WORRYING TOO MUCH ABOUT DIFFERENT THINGS: SEVERAL DAYS
4. TROUBLE RELAXING: MORE THAN HALF THE DAYS
GAD7 TOTAL SCORE: 10

## 2017-11-30 ASSESSMENT — PATIENT HEALTH QUESTIONNAIRE - PHQ9
10. IF YOU CHECKED OFF ANY PROBLEMS, HOW DIFFICULT HAVE THESE PROBLEMS MADE IT FOR YOU TO DO YOUR WORK, TAKE CARE OF THINGS AT HOME, OR GET ALONG WITH OTHER PEOPLE: SOMEWHAT DIFFICULT
SUM OF ALL RESPONSES TO PHQ QUESTIONS 1-9: 11
SUM OF ALL RESPONSES TO PHQ QUESTIONS 1-9: 11

## 2017-11-30 NOTE — PROGRESS NOTES
SUBJECTIVE:   Chiquita Villaseñor is a 37 year old female who presents to clinic today for the following health issues:    Depression and Anxiety Follow-Up    Status since last visit: Improved    Other associated symptoms:None    Complicating factors:     Significant life event: No     Current substance abuse: Alcohol    PHQ-9 Score and MyChart F/U Questions 10/23/2017 10/27/2017 11/30/2017   Total Score 4 6 11   Q9: Suicide Ideation Not at all Not at all Not at all     SERENITY-7 SCORE 1/10/2017 10/27/2017 11/30/2017   Total Score - - 10 (moderate anxiety)   Total Score 7 10 10     PHQ-9  English  PHQ-9   Any Language  GAD7  Suicide Assessment Five-step Evaluation and Treatment (SAFE-T)    Chronic Pain Follow-Up       Type / Location of Pain: Fibromyalgia, neck  Analgesia/pain control:       Recent changes:  improved      Overall control: Comfortably manageable  Activity level/function:      Daily activities:  Able to do all daily activities    Work:  Able to work part time without limitations  Adverse effects:  No  Adherance    Taking medication as directed?  Yes    Participating in other treatments: not applicable  Risk Factors:    Sleep:  Good    Mood/anxiety:  controlled    Recent family or social stressors:  none noted    Other aggravating factors: none  PHQ-9 SCORE 10/23/2017 10/27/2017 11/30/2017   Total Score - - -   Total Score MyChart - - 11 (Moderate depression)   Total Score 4 6 11     SERENITY-7 SCORE 1/10/2017 10/27/2017 11/30/2017   Total Score - - 10 (moderate anxiety)   Total Score 7 10 10     Encounter-Level CSA:     There are no encounter-level csa.          Amount of exercise or physical activity: walks 30 minutes per day    Problems taking medications regularly: No    Medication side effects: none    Diet: regular (no restrictions)    Reviewed and updated as needed this visit by clinical staff  Tobacco  Allergies  Meds  Problems  Med Hx  Surg Hx  Fam Hx  Soc Hx        Reviewed and updated as  needed this visit by Provider  Tobacco  Allergies  Meds  Problems  Med Hx  Surg Hx  Fam Hx  Soc Hx        Additional complaints: None    HPI additional notes: Chiquita presents today with   Chief Complaint   Patient presents with     Pain     Depression   Pt notes significant improvement in both her mood and her pain since switching to the cymbalta.  We will continue at her current dose.  She has been taking the klonopin prn for panic attacks and has not been mixing with alcohol.  She took her last one last night so she has not been using it more than prescribed.     ROS:  C: Negative for fever, chills, recent change in weight  Skin: Negative for worrisome rashes or lesions  ENT: Negative for ear, mouth and throat problems  Resp: Negative for significant cough or SOB  CV: Negative for chest pain or peripheral edema  GI: Negative for nausea, abdominal pain, heartburn, or change in bowel habits  MS: Negative for significant arthralgias or myalgias  PSYCHIATRIC: POSITIVE for depressed mood, anxiety, alcohol abuse, panic attack or fatigue. Negative for thoughts of self harm or suicide  ROS all other systems negative.    Chart Review:  History   Smoking Status     Current Every Day Smoker     Packs/day: 1.00     Years: 20.00     Types: Cigarettes   Smokeless Tobacco     Never Used     Comment: started at age 16 - trying to quit with e-cigarettes     PHQ-9 SCORE 10/23/2017 10/27/2017 11/30/2017   Total Score - - -   Total Score MyChart - - 11 (Moderate depression)   Total Score 4 6 11     SERENITY-7 SCORE 1/10/2017 10/27/2017 11/30/2017   Total Score - - 10 (moderate anxiety)   Total Score 7 10 10     Patient Active Problem List   Diagnosis     Irritable bowel syndrome     Anxiety     Tobacco use disorder     S/P LEEP of cervix - 2004     Major depressive disorder, recurrent episode, moderate (HCC)     Chronic rhinitis     Chronic pain syndrome     Neck pain     Alcohol abuse     Fibromyalgia     Past Surgical History:    Procedure Laterality Date     C NONSPECIFIC PROCEDURE  389879    dilation of urethra   abstr [id number]     GYN SURGERY  2004    cone biopsy, LEEP for dysplasia + HPV     HC TOOTH EXTRACTION W/FORCEP  1999     Problem list, Medication list, Allergies, Medical/Social/Surg hx reviewed in [facility], updated as appropriate.   OBJECTIVE:                                                    /64  Pulse 99  Temp 98.4  F (36.9  C) (Tympanic)  Resp 20  Wt 125 lb (56.7 kg)  SpO2 96%  BMI 21.46 kg/m2  Body mass index is 21.46 kg/(m^2).  GENERAL: healthy, alert, in no acute distress  SKIN: no suspicious lesions, no rashes  PSYCH:Mental Status Exam  Behavior: cooperative, pleasant and calm  Speech: normal rate and rhythm  Mood: description consistent with euthymia  Affect: Appropriate/mood-congruent  Thought Processes: Logical and Goal directed  Thought Content: no evidence of suicidal or homicidal ideation and no overt psychosis  Insight: Adequate  Judgment: Adequate for safety      Diagnostic test results: none      ASSESSMENT/PLAN:                                                          ICD-10-CM    1. Major depressive disorder, recurrent episode, moderate (HCC) F33.1    2. Fibromyalgia M79.7    3. Anxiety F41.9    4. Tobacco use disorder F17.200      Will continue cymbalta for fibromyalgia and depression as pt is doing much better since starting last month.  Will also continue klonopin but will need to switch to a different medication if pt is needing to take it more than 3 times per week.  Pt does report her depression has improved, even though her phq-9 is higher today than it was last month.    Will trial naltrexone to try and help with alcohol abuse.  Pt is also working on trying to quit smoking and has the gum and patches at home which were prescribed in Sept.    Will follow up for recheck in 3 months.  Refills provided until that time.  She is also due for a physical so we can do both at the same time.      Please  see patient instructions for treatment details.    Follow up office visit in 3 months, sooner PRN.    Yun Palma PA-C  [facility]       Answers for HPI/ROS submitted by the patient on 11/30/2017   If you checked off any problems, how difficult have these problems made it for you to do your work, take care of things at home, or get along with other people?: Somewhat difficult  PHQ9 TOTAL SCORE: 11  SERENITY 7 TOTAL SCORE: 10

## 2017-11-30 NOTE — MR AVS SNAPSHOT
"              After Visit Summary   11/30/2017    Chiquita Villaseñor    MRN: [id number]           Patient Information     Date Of Birth          1980        Visit Information        Provider Department      11/30/2017 1:10 PM Yun Palma PA-C [facility]        Today's Diagnoses     Major depressive disorder, recurrent episode, moderate (HCC)    -  1    Fibromyalgia        Anxiety        Tobacco use disorder        Major depressive disorder, recurrent episode, moderate (H)        Neck pain        Alcohol abuse           Follow-ups after your visit        Who to contact     If you have questions or need follow up information about today's clinic visit or your schedule please contact [facility] directly at [phone].  Normal or non-critical lab and imaging results will be communicated to you by [facility], letter or phone within 4 business days after the clinic has received the results. If you do not hear from us within 7 days, please contact the clinic through [facility] or phone. If you have a critical or abnormal lab result, we will notify you by phone as soon as possible.  Submit refill requests through [employer] or call your pharmacy and they will forward the refill request to us. Please allow 3 business days for your refill to be completed.          Additional Information About Your Visit        [facility] Information     [employer] lets you send messages to your doctor, view your test results, renew your prescriptions, schedule appointments and more. To sign up, go to [email or web address] . Click on \"Log in\" on the left side of the screen, which will take you to the Welcome page. Then click on \"Sign up Now\" on the right side of the page.     You will be asked to enter the access code listed below, as well as some personal information. Please follow the directions to create your username and password.     Your access code is: " [email or web address]  Expires: 2018  7:22 AM     Your access code will  in 90 days. If you need help or a new code, please call your [facility] clinic or [phone].        Care EveryWhere ID     This is your Care EveryWhere ID. This could be used by other organizations to access your [facility] medical records  [id number]        Your Vitals Were     Pulse Temperature Respirations Pulse Oximetry BMI (Body Mass Index)       99 98.4  F (36.9  C) (Tympanic) 20 96% 21.46 kg/m2        Blood Pressure from Last 3 Encounters:   17 110/64   10/27/17 120/64   01/10/17 124/64    Weight from Last 3 Encounters:   17 125 lb (56.7 kg)   10/27/17 127 lb (57.6 kg)   01/10/17 109 lb (49.4 kg)              Today, you had the following     No orders found for display         Today's Medication Changes          These changes are accurate as of: 17  1:46 PM.  If you have any questions, ask your nurse or doctor.               Start taking these medicines.        Dose/Directions    naltrexone 50 MG tablet   Commonly known as:  DEPADE;REVIA   Used for:  Alcohol abuse   Started by:  Yun Palma PA-C        Dose:  50 mg   Take 1 tablet (50 mg) by mouth daily   Quantity:  30 tablet   Refills:  2         Stop taking these medicines if you haven't already. Please contact your care team if you have questions.     nicotine 14 MG/24HR 24 hr patch   Commonly known as:  NICODERM CQ   Stopped by:  Yun Palma PA-C           nicotine polacrilex 2 MG lozenge   Commonly known as:  COMMIT   Stopped by:  Yun Palma PA-C                Where to get your medicines      These medications were sent to [id number] [place], [place] -  [facility]   [facility] [address]     Phone:  [phone]     DULoxetine 30 MG EC capsule    naltrexone 50 MG tablet         Some of these will need a paper prescription and others can be bought over the counter.   Ask your nurse if you have questions.     Bring a paper prescription for each of these medications     clonazePAM 0.5 MG tablet                Primary Care Provider Office Phone # Fax #    Yun Palma PA-C [phone]       [address]   [facility] [address]        Equal Access to Services     BRENDA MON : Hadii aad ku hadasho Soomaali, waaxda luqadaha, qaybta kaalmada adeegyada, waxay idiin hayaan adeeg kharash laritchien . So [facility] [phone].    ATENCIÓN: Si habla español, tiene a hodges disposición servicios gratuitos de asistencia lingüística. Llame al [phone].    We comply with applicable federal civil rights laws and [place] laws. We do not discriminate on the basis of race, color, national origin, age, disability, sex, sexual orientation, or gender identity.            Thank you!     Thank you for choosing [facility]  for your care. Our goal is always to provide you with excellent care. Hearing back from our patients is one way we can continue to improve our services. Please take a few minutes to complete the written survey that you may receive in the mail after your visit with us. Thank you!             Your Updated Medication List - Protect others around you: Learn how to safely use, store and throw away your medicines at www.disposemymeds.org.          This list is accurate as of: 11/30/17  1:46 PM.  Always use your most recent med list.                   Brand Name Dispense Instructions for use Diagnosis    busPIRone 15 MG tablet    BUSPAR    180 tablet    Take 1 tablet (15 mg) by mouth 2 times daily    Anxiety       clonazePAM 0.5 MG tablet    klonoPIN    15 tablet    Take 1/2-1 tab up to 3 days per week as needed for anxiety.    Major depressive disorder, recurrent episode, moderate (H)       DULoxetine 30 MG EC capsule    CYMBALTA    60 capsule    Take 1 capsule (30 mg) by mouth 2 times daily    Major depressive disorder,  recurrent episode, moderate (H), Neck pain       folic acid 1 MG tablet    FOLVITE    100 tablet    Take 1 tablet (1 mg) by mouth daily    Alcohol abuse       gabapentin 300 MG capsule    NEURONTIN    270 capsule    Take 1 capsule (300 mg) by mouth 3 times daily    Neck pain       ibuprofen 800 MG tablet    ADVIL/MOTRIN    90 tablet    Take 1 tablet (800 mg) by mouth every 8 hours as needed for moderate pain (wih food)    Chronic pain       naltrexone 50 MG tablet    DEPADE;REVIA    30 tablet    Take 1 tablet (50 mg) by mouth daily    Alcohol abuse

## 2017-11-30 NOTE — NURSING NOTE
"Chief Complaint   Patient presents with     Pain     Depression       Initial /64  Pulse 99  Temp 98.4  F (36.9  C) (Tympanic)  Resp 20  Wt 125 lb (56.7 kg)  SpO2 96%  BMI 21.46 kg/m2 Estimated body mass index is 21.46 kg/(m^2) as calculated from the following:    Height as of 1/10/17: 5' 4\" (1.626 m).    Weight as of this encounter: 125 lb (56.7 kg).  Medication Reconciliation: complete    "

## 2017-12-01 ASSESSMENT — ANXIETY QUESTIONNAIRES: GAD7 TOTAL SCORE: 10

## 2017-12-02 ENCOUNTER — TELEPHONE (OUTPATIENT)
Dept: FAMILY MEDICINE | Facility: CLINIC | Age: 37
End: 2017-12-02

## 2017-12-02 ENCOUNTER — NURSE TRIAGE (OUTPATIENT)
Dept: NURSING | Facility: CLINIC | Age: 37
End: 2017-12-02

## 2017-12-02 DIAGNOSIS — F10.10 ALCOHOL ABUSE: Primary | Chronic | ICD-10-CM

## 2017-12-02 NOTE — TELEPHONE ENCOUNTER
Clinic Action Needed:  Yes, callback  [facility] Triage Call  Presenting Problem:    Chiquita is calling today as she took Naltrexone at noon today and then developed vomiting and nausea and hot and cold shakes  And cramping.  Chiquita also states that she is taking Kratom which is herbal.  Please phone Chiquita on Monday.      Routed to:  RN Pool  Please be sure to close this encounter once this patient's issue/question has been addressed.    Tami Augustin RN/[facility]

## 2017-12-02 NOTE — TELEPHONE ENCOUNTER
Chiquita is calling today as she took Naltrexone at noon today and then developed vomiting and nausea and hot and cold shakes  And cramping.  Chiquita also states that she is taking Kratom which is herbal.  Please phone Chiquita on Monday.

## 2017-12-04 RX ORDER — DISULFIRAM 250 MG/1
250 TABLET ORAL DAILY
Qty: 30 TABLET | Refills: 1 | Status: SHIPPED | OUTPATIENT
Start: 2017-12-04

## 2017-12-04 NOTE — TELEPHONE ENCOUNTER
FYI-  Patient reports she was vomiting and 15 minutes after taking Naltrexone. She called to see how she could improve symptoms but this resolved. Pt notes that she was is taking Kratom and though PCP was aware she was taking that. Pt will no longer take Naltrexone.

## 2017-12-04 NOTE — TELEPHONE ENCOUNTER
She can try it.  I don't know if it will have an interaction with the adalitom because there is no data on it.  She can stop if she has a bad reaction.

## 2017-12-04 NOTE — TELEPHONE ENCOUNTER
We didn't have Kratom  on her medication list, I added it.  It's hard to know what interactions can occur with herbal supplements.  Would she consider antabuse?  She needs to have stopped alcohol use before taking it.

## 2018-01-31 ENCOUNTER — TELEPHONE (OUTPATIENT)
Dept: FAMILY MEDICINE | Facility: CLINIC | Age: 38
End: 2018-01-31

## 2018-01-31 DIAGNOSIS — M54.2 NECK PAIN: ICD-10-CM

## 2018-01-31 DIAGNOSIS — F33.1 MAJOR DEPRESSIVE DISORDER, RECURRENT EPISODE, MODERATE (H): ICD-10-CM

## 2018-01-31 NOTE — TELEPHONE ENCOUNTER
Reason for Call:  Other prescription    Detailed comments:    Patient now has insurance we don't take   She wants to know if she can still have Malorie refill her medications for awhile    She is requesting an increase in dosage of her clonazePAM (KLONOPIN) 0.5 MG tablet and an upcoming refill of Cymbalta     Please call her to discuss    Phone Number Patient can be reached at: Home number on file [phone] (home)    Best Time: anytime    Can we leave a detailed message on this number? YES    Call taken on 1/31/2018 at 1:48 PM by LEONARDO COREAS

## 2018-01-31 NOTE — TELEPHONE ENCOUNTER
Pt needs to be seen.  I will not increase her klonopin, she is currently on the highest dose I will prescribe due to her history of alcohol use.  I can put in a refill for cymbalta.

## 2018-01-31 NOTE — TELEPHONE ENCOUNTER
BARBARA Whittington - please advise if you would do this and under what circumstances please.  The patient has not been called back yet.

## 2018-02-01 RX ORDER — DULOXETIN HYDROCHLORIDE 30 MG/1
30 CAPSULE, DELAYED RELEASE ORAL 2 TIMES DAILY
Qty: 60 CAPSULE | Refills: 2 | Status: SHIPPED | OUTPATIENT
Start: 2018-02-01 | End: 2018-04-11

## 2018-02-01 NOTE — TELEPHONE ENCOUNTER
She does want refill of Cymbalta but she wants to talk with Malorie please on the phone to let her know how she is doing.  I told her I would see if Malorie would be able to do that.     Routing to Malorie Peter RN- Triage [employer]

## 2018-02-01 NOTE — TELEPHONE ENCOUNTER
Tried calling pt, left message.  Refilled cymbalta, will try calling her again later this afternoon or she can try reaching me back at the clinic, number given.

## 2018-02-02 NOTE — TELEPHONE ENCOUNTER
Still unable to get ahold of pt, please try calling her again if you get a chance this afternoon to get an update.

## 2018-02-02 NOTE — TELEPHONE ENCOUNTER
Left message for Chiquita to keep her phone on so Malorie can reach her.  Ilene Peter RN- Triage [employer]

## 2018-02-10 ENCOUNTER — TRANSFERRED RECORDS (OUTPATIENT)
Dept: HEALTH INFORMATION MANAGEMENT | Facility: CLINIC | Age: 38
End: 2018-02-10

## 2018-02-21 DIAGNOSIS — F33.1 MAJOR DEPRESSIVE DISORDER, RECURRENT EPISODE, MODERATE (H): ICD-10-CM

## 2018-02-21 DIAGNOSIS — G89.4 CHRONIC PAIN SYNDROME: Chronic | ICD-10-CM

## 2018-02-22 RX ORDER — CLONAZEPAM 0.5 MG/1
TABLET ORAL
Qty: 15 TABLET | Refills: 1 | Status: SHIPPED | OUTPATIENT
Start: 2018-02-22

## 2018-02-22 NOTE — TELEPHONE ENCOUNTER
clonazePAM (KLONOPIN) 0.5 MG tablet      Last Written Prescription Date:  11/30/17  Last Fill Quantity: 15,   # refills: 2  Last Office Visit: 11/30/17  Future Office visit:       Routing refill request to provider for review/approval because:  Drug not on the [facility], [facility] or [facility] refill protocol or controlled substance    Controlled Substance Refill Request for clonazePAM (KLONOPIN) 0.5 MG tablet  Problem List Complete:  No     PROVIDER TO CONSIDER COMPLETION OF PROBLEM LIST AND OVERVIEW/CONTROLLED SUBSTANCE AGREEMENT    Last Written Prescription Date:  See above  Last Fill Quantity: see above,   # refills: see above    Last Office Visit with [facility] primary care provider: 11/30/17    Future Office visit:     Controlled substance agreement on file: No.     Processing:  Patient will  in clinic   checked in past 6 months?  Yes 2/22/18 no concerns

## 2018-03-01 ENCOUNTER — TELEPHONE (OUTPATIENT)
Dept: FAMILY MEDICINE | Facility: CLINIC | Age: 38
End: 2018-03-01

## 2018-03-01 NOTE — TELEPHONE ENCOUNTER
Does [facility] have a specific disability form they need completed?  Usually they have their own form.

## 2018-03-01 NOTE — TELEPHONE ENCOUNTER
Patient called requesting a letter from BARBARA Whittington that she is very depressed and can not work at this time. She used to have her own place and a job, but had issues with bed bugs and was kicked out. She tried living with her mother who is not mentally healthy and she is not able to live with her. Patient is now staying in a [facility] shelter which changes locations often, so she is unable to hold a job. Before she can get a job, she needs to stay in a good environment, but to do that she needs the [facility] to help her financially. She is hoping that this letter will help her somehow. PHQ-9 complete with a score of 14. Provider to review.

## 2018-03-01 NOTE — TELEPHONE ENCOUNTER
Per Carole: Patient will need to be seen to determine ability to work. Shelter will assist her with employment, insurance and housing if she works with them.     She will try and contact pt.  She needs to be seen, I can't just write a letter.  And Carole can help with free clinics that can do this for her.

## 2018-03-02 ENCOUNTER — CARE COORDINATION (OUTPATIENT)
Dept: CARE COORDINATION | Facility: CLINIC | Age: 38
End: 2018-03-02

## 2018-03-02 ASSESSMENT — PATIENT HEALTH QUESTIONNAIRE - PHQ9: SUM OF ALL RESPONSES TO PHQ QUESTIONS 1-9: 14

## 2018-03-02 NOTE — TELEPHONE ENCOUNTER
Informed pt OV needed to determine ability to work. Pt states she just changed insurance, clinic does nota take her insurance. She doesn't  have a new PCP and would like to continue seeing same provider. Informed pt, message will be send to care coordinator who would contact  with community resources.  Pt verbalized agreement with plan.

## 2018-03-06 NOTE — PROGRESS NOTES
Clinic Care Coordination Contact  [facility]/Voicemail    Referral Source: PCP    Clinical Data: Care Coordinator Outreach    Outreach attempted x 2.  Left message on voicemail with call back information and requested return call.    Patient's new insurance is not accepted in [facility] clinic. Patient will need to find a new provider.     Plan: Care Coordinator will do no further outreaches at this time.  If patient returns call, clinic care coordinator will assist with alternative clinics for care.     Carole Curtis RN, CCM - Care Coordinator     3/6/2018    1:20 PM  [phone]

## 2018-04-11 DIAGNOSIS — F33.1 MAJOR DEPRESSIVE DISORDER, RECURRENT EPISODE, MODERATE (H): ICD-10-CM

## 2018-04-11 DIAGNOSIS — M54.2 NECK PAIN: ICD-10-CM

## 2018-04-11 RX ORDER — CITALOPRAM HYDROBROMIDE 40 MG/1
TABLET ORAL
Qty: 30 TABLET | Refills: 0 | OUTPATIENT
Start: 2018-04-11

## 2018-04-11 RX ORDER — DULOXETIN HYDROCHLORIDE 30 MG/1
30 CAPSULE, DELAYED RELEASE ORAL 2 TIMES DAILY
Qty: 60 CAPSULE | Refills: 0 | Status: SHIPPED | OUTPATIENT
Start: 2018-04-11

## 2018-04-11 NOTE — TELEPHONE ENCOUNTER
Reason for Call:  Medication or medication refill:    Do you use a [facility]?  Name of the pharmacy and phone number for the current request:  [facility] [address]    Name of the medication requested: CYMBALTA 30MG     Other request: Patient states she only has two days worth of medication left. Please call with questions    Can we leave a detailed message on this number? YES    Phone number patient can be reached at: Home number on file [phone] (home)    Best Time: any    Call taken on 4/11/2018 at 11:40 AM by Blanka Larson

## 2018-04-11 NOTE — TELEPHONE ENCOUNTER
Routing refill request to provider for review/approval because:  Drug not active on patient's medication list

## 2018-04-11 NOTE — TELEPHONE ENCOUNTER
"Requested Prescriptions   Pending Prescriptions Disp Refills     DULoxetine (CYMBALTA) 30 MG EC capsule  Last Written Prescription Date:  2/1/18  Last Fill Quantity: 60,  # refills: 2   Last office visit: 11/30/2017 with prescribing provider:  Louie   Future Office Visit:     60 capsule 2     Sig: Take 1 capsule (30 mg) by mouth 2 times daily    Serotonin-Norepinephrine Reuptake Inhibitors  Passed    4/11/2018 11:41 AM       Passed - Blood pressure under 140/90 in past 12 months    BP Readings from Last 3 Encounters:   11/30/17 110/64   10/27/17 120/64   01/10/17 124/64                Passed - PHQ-9 score of less than 5 in past 6 months    Please review last PHQ-9 score.          Passed - Patient is age 18 or older       Passed - No active pregnancy on record       Passed - No positive pregnancy test in past 12 months       Passed - Recent (6 mo) or future (30 days) visit within the authorizing provider's specialty    Patient had office visit in the last 6 months or has a visit in the next 30 days with authorizing provider or within the authorizing provider's specialty.  See \"Patient Info\" tab in inbasket, or \"Choose Columns\" in Meds & Orders section of the refill encounter.              "

## 2018-04-11 NOTE — TELEPHONE ENCOUNTER
Last visit with Malorie 11/30/17 she was prescribing Duloxetine, nothing about Citalopram.  She also wanted Pt to be seen for recheck in 3 mo  I will deny the Citalopram for now at least

## 2018-04-11 NOTE — TELEPHONE ENCOUNTER
Medication is being filled for 1 time refill only due to:  Patient needs office visit or e-visit for more refills.

## 2018-04-11 NOTE — TELEPHONE ENCOUNTER
"Requested Prescriptions   Pending Prescriptions Disp Refills     citalopram (CELEXA) 40 MG tablet [Pharmacy Med Name: Citalopram Hydrobromide Oral Tablet 40 MG]  Last Written Prescription Date:  10/23/17  Last Fill Quantity: 30,  # refills: 0   Last office visit: 11/30/2017 with prescribing provider:  Louie   Future Office Visit:     30 tablet 0     Sig: TAKE ONE TABLET BY MOUTH ONE TIME DAILY    SSRIs Protocol Passed    4/11/2018 11:32 AM       Passed - PHQ-9 score less than 5 in past 6 months    Please review last PHQ-9 score.          Passed - Patient is age 18 or older       Passed - No active pregnancy on record       Passed - No positive pregnancy test in last 12 months       Passed - Recent (6 mo) or future (30 days) visit within the authorizing provider's specialty    Patient had office visit in the last 6 months or has a visit in the next 30 days with authorizing provider or within the authorizing provider's specialty.  See \"Patient Info\" tab in inbasket, or \"Choose Columns\" in Meds & Orders section of the refill encounter.              "

## 2018-04-12 NOTE — TELEPHONE ENCOUNTER
Patient reports she is not taking Celexa. Reminded her she is due for OV. States her insurance changed to health [employer]. Advised she follow up with a [facility] physician for refills. Pt reports she is planning to change to an insurance covered by [facility] clinic to continue with Malorie on next open enrollment.

## 2018-10-07 DIAGNOSIS — F10.10 ALCOHOL ABUSE: ICD-10-CM

## 2018-10-08 NOTE — TELEPHONE ENCOUNTER
"Requested Prescriptions   Pending Prescriptions Disp Refills     folic acid (FOLVITE) 1 MG tablet [Pharmacy Med Name: FOLIC ACID 1MG TABLETS]  Last Written Prescription Date:  1/13/17  Last Fill Quantity: 100,  # refills: 3  Last office visit: 11/30/2017 with prescribing provider:  Louie   Future Office Visit:     336 tablet 0     Sig: TAKE ONE TABLET BY MOUTH EVERY DAY    Vitamin Supplements (Adult) Protocol Passed    10/7/2018  1:34 PM       Passed - High dose Vitamin D not ordered       Passed - Recent (12 mo) or future (30 days) visit within the authorizing provider's specialty    Patient had office visit in the last 12 months or has a visit in the next 30 days with authorizing provider or within the authorizing provider's specialty.  See \"Patient Info\" tab in inbasket, or \"Choose Columns\" in Meds & Orders section of the refill encounter.              "

## 2018-10-09 RX ORDER — FOLIC ACID 1 MG/1
TABLET ORAL
Qty: 336 TABLET | Refills: 0 | OUTPATIENT
Start: 2018-10-09

## 2019-05-05 NOTE — ADDENDUM NOTE
Addended by: PAULINA WALKER on: 1/10/2017 02:53 PM     Modules accepted: Orders, SmartSet    
Addended by: PIA HELM on: 1/13/2017 11:41 AM     Modules accepted: Orders    
Name band;